# Patient Record
Sex: MALE | Race: WHITE | NOT HISPANIC OR LATINO | URBAN - NONMETROPOLITAN AREA
[De-identification: names, ages, dates, MRNs, and addresses within clinical notes are randomized per-mention and may not be internally consistent; named-entity substitution may affect disease eponyms.]

---

## 2018-07-25 PROBLEM — H35.3121: Noted: 2018-07-25

## 2018-07-25 PROBLEM — Z96.1: Noted: 2018-07-25

## 2018-07-25 PROBLEM — H40.1131: Noted: 2017-10-12

## 2018-07-25 PROBLEM — E11.9: Noted: 2018-03-28

## 2019-01-10 ENCOUNTER — IMPORTED ENCOUNTER (OUTPATIENT)
Dept: URBAN - NONMETROPOLITAN AREA CLINIC 1 | Facility: CLINIC | Age: 84
End: 2019-01-10

## 2019-01-10 PROCEDURE — 92012 INTRM OPH EXAM EST PATIENT: CPT

## 2019-01-10 PROCEDURE — 92083 EXTENDED VISUAL FIELD XM: CPT

## 2019-05-06 ENCOUNTER — IMPORTED ENCOUNTER (OUTPATIENT)
Dept: URBAN - NONMETROPOLITAN AREA CLINIC 1 | Facility: CLINIC | Age: 84
End: 2019-05-06

## 2019-05-06 PROCEDURE — 92012 INTRM OPH EXAM EST PATIENT: CPT

## 2019-05-06 NOTE — PATIENT DISCUSSION
COAG-Continue Timolol OU as directed. Stressed importance of compliance.-Continue Latanoprost OU IOP today 10OD 15OS- S/P SLT OD - S/P SLT OS - 8/1/48- Order repeat VF n/a AMD - dry-Explained dry AMD and advised that there are no treatments available at this time.-Continue AREDS 2 MVT. -Continue Amsler grid monitoring daily. Pt is to contact our office if any changes are noted. PSEUDOPHAKIA OUMONITORDM s DR-Stressed the importance of keeping blood sugars under control blood pressure under control and weight normalization and regular visits with PCP. -Explained the possible effects of poorly controlled diabetes and the damage that diabetes can cause to ocular health. -Patient to check HgbA1C.-Pt instructed to contact our office with any vision changes.; Dr's Notes: OCT Javon Ruiz 74 7/18/2018VF 6/7/2017[place] [facility]

## 2019-06-21 NOTE — PATIENT DISCUSSION
The patient was informed that with the [employer] Multifocal lens, mid-range vision will not be as strong as the distance and near vision and they may require glasses for mid-range activities such as a desktop computer. Side effects, specifically halos, reduced contrast, and a 1 in 500 exchange rate due to failure to adapt to the lens were discussed as was the need for enhancement in some cases. The patient elects TMF +3.25 OS, goal of emmetropia.

## 2020-03-12 ENCOUNTER — IMPORTED ENCOUNTER (OUTPATIENT)
Dept: URBAN - NONMETROPOLITAN AREA CLINIC 1 | Facility: CLINIC | Age: 85
End: 2020-03-12

## 2020-03-12 PROCEDURE — 99213 OFFICE O/P EST LOW 20 MIN: CPT

## 2020-03-12 NOTE — PATIENT DISCUSSION
COAG-IOP 15:17 03/12/20-Continue Timolol qd OU as directed. -Continue Latanoprost qd OU -Stressed importance of compliance. -S/P SLT OD -S/P SLT OS - 8/1/48-Order OCT ONH -Continue to monitorAMD - dry-Explained dry AMD and advised that there are no treatments available at this time.-Continue AREDS 2 MVT. -Continue Amsler grid monitoring daily. Pt is to contact our office if any changes are noted. PSEUDOPHAKIA OUMONITORDM s DR-Stressed the importance of keeping blood sugars under control blood pressure under control and weight normalization and regular visits with PCP. -Explained the possible effects of poorly controlled diabetes and the damage that diabetes can cause to ocular health. -Patient to check HgbA1C.-Pt instructed to contact our office with any vision changes.; Dr's Notes: NISHA Ruiz 74 7/18/2018VF 6/7/2017Cornelio 7/25/18Sara Cristian Late Dr. Yoni Fernandez office

## 2022-04-09 ASSESSMENT — PACHYMETRY
OS_CT_UM: 525; ADJ: THIN
OD_CT_UM: 535; ADJ: THIN
OS_CT_UM: 525; ADJ: THIN
OS_CT_UM: 525; ADJ: THIN
OD_CT_UM: 535; ADJ: THIN
OD_CT_UM: 535; ADJ: THIN

## 2022-04-09 ASSESSMENT — VISUAL ACUITY
OS_CC: 20/30+2
OS_CC: 20/30+2
OU_CC: 20/20-2
OU_CC: J2
OD_CC: 20/25-2
OS_CC: 20/30-2
OD_CC: 20/30
OD_CC: 20/30

## 2022-04-09 ASSESSMENT — TONOMETRY
OS_IOP_MMHG: 16
OD_IOP_MMHG: 15
OS_IOP_MMHG: 17
OD_IOP_MMHG: 10
OD_IOP_MMHG: 16
OS_IOP_MMHG: 11
OS_IOP_MMHG: 15

## 2025-04-29 NOTE — PATIENT DISCUSSION
COAG-Continue Timolol OU as directed. Stressed importance of compliance.-Continue Latanoprost OU - S/P SLT OD - S/P SLT OS - 8/1/48- RTC 4 mo TA check AMD - dry-Explained dry AMD and advised that there are no treatments available at this time.-Continue AREDS 2 MVT. -Continue Amsler grid monitoring daily. Pt is to contact our office if any changes are noted. PSEUDOPHAKIA OUMONITORDM s DR-Stressed the importance of keeping blood sugars under control blood pressure under control and weight normalization and regular visits with PCP. -Explained the possible effects of poorly controlled diabetes and the damage that diabetes can cause to ocular health. -Patient to check HgbA1C.-Pt instructed to contact our office with any vision changes.; Dr's Notes: NISHA Ruiz 74 7/18/2018VF 6/7/2017Gooscar 7/25/18Sara Andrew PARKER [facility] office
supervision